# Patient Record
Sex: FEMALE | Race: BLACK OR AFRICAN AMERICAN | NOT HISPANIC OR LATINO
[De-identification: names, ages, dates, MRNs, and addresses within clinical notes are randomized per-mention and may not be internally consistent; named-entity substitution may affect disease eponyms.]

---

## 2017-01-09 ENCOUNTER — APPOINTMENT (OUTPATIENT)
Dept: OBGYN | Facility: CLINIC | Age: 22
End: 2017-01-09

## 2017-01-11 ENCOUNTER — APPOINTMENT (OUTPATIENT)
Dept: FAMILY MEDICINE | Facility: CLINIC | Age: 22
End: 2017-01-11

## 2017-02-07 ENCOUNTER — APPOINTMENT (OUTPATIENT)
Dept: OBGYN | Facility: CLINIC | Age: 22
End: 2017-02-07

## 2017-12-01 ENCOUNTER — OUTPATIENT (OUTPATIENT)
Dept: OUTPATIENT SERVICES | Facility: HOSPITAL | Age: 22
LOS: 1 days | End: 2017-12-01

## 2017-12-17 ENCOUNTER — EMERGENCY (EMERGENCY)
Facility: HOSPITAL | Age: 22
LOS: 1 days | Discharge: LEFT BEFORE DISCHARGE | End: 2017-12-17
Admitting: EMERGENCY MEDICINE
Payer: MEDICAID

## 2017-12-17 VITALS — WEIGHT: 125 LBS | HEIGHT: 65 IN

## 2017-12-17 VITALS
DIASTOLIC BLOOD PRESSURE: 53 MMHG | HEART RATE: 73 BPM | OXYGEN SATURATION: 100 % | SYSTOLIC BLOOD PRESSURE: 124 MMHG | TEMPERATURE: 98 F | RESPIRATION RATE: 18 BRPM

## 2017-12-17 LAB
APPEARANCE UR: CLEAR — SIGNIFICANT CHANGE UP
BILIRUB UR-MCNC: NEGATIVE — SIGNIFICANT CHANGE UP
COLOR SPEC: YELLOW — SIGNIFICANT CHANGE UP
DIFF PNL FLD: NEGATIVE — SIGNIFICANT CHANGE UP
EPI CELLS # UR: SIGNIFICANT CHANGE UP
GLUCOSE UR QL: NEGATIVE MG/DL — SIGNIFICANT CHANGE UP
HCG UR QL: POSITIVE
KETONES UR-MCNC: NEGATIVE — SIGNIFICANT CHANGE UP
LEUKOCYTE ESTERASE UR-ACNC: NEGATIVE — SIGNIFICANT CHANGE UP
NITRITE UR-MCNC: NEGATIVE — SIGNIFICANT CHANGE UP
PH UR: 7 — SIGNIFICANT CHANGE UP (ref 5–8)
PROT UR-MCNC: 15 MG/DL
SP GR SPEC: 1.01 — SIGNIFICANT CHANGE UP (ref 1.01–1.02)
UROBILINOGEN FLD QL: NEGATIVE MG/DL — SIGNIFICANT CHANGE UP
WBC UR QL: SIGNIFICANT CHANGE UP

## 2017-12-17 PROCEDURE — 81001 URINALYSIS AUTO W/SCOPE: CPT

## 2017-12-17 PROCEDURE — 81025 URINE PREGNANCY TEST: CPT

## 2017-12-17 NOTE — ED ADULT TRIAGE NOTE - CHIEF COMPLAINT QUOTE
had an  medical   and stopped bleeding 2017. states she still feels pregnant has lower back pain with migraines. no bleeding

## 2017-12-19 DIAGNOSIS — R69 ILLNESS, UNSPECIFIED: ICD-10-CM

## 2018-01-17 ENCOUNTER — EMERGENCY (EMERGENCY)
Facility: HOSPITAL | Age: 23
LOS: 1 days | Discharge: HOME | End: 2018-01-17
Attending: EMERGENCY MEDICINE | Admitting: EMERGENCY MEDICINE
Payer: MEDICAID

## 2018-01-17 VITALS
SYSTOLIC BLOOD PRESSURE: 102 MMHG | OXYGEN SATURATION: 100 % | DIASTOLIC BLOOD PRESSURE: 67 MMHG | TEMPERATURE: 99 F | WEIGHT: 125 LBS | HEART RATE: 96 BPM | RESPIRATION RATE: 18 BRPM | HEIGHT: 69 IN

## 2018-01-17 LAB
APPEARANCE UR: CLEAR — SIGNIFICANT CHANGE UP
BILIRUB UR-MCNC: NEGATIVE — SIGNIFICANT CHANGE UP
COLOR SPEC: YELLOW — SIGNIFICANT CHANGE UP
DIFF PNL FLD: ABNORMAL
GLUCOSE UR QL: NEGATIVE MG/DL — SIGNIFICANT CHANGE UP
HCG UR QL: POSITIVE
KETONES UR-MCNC: NEGATIVE — SIGNIFICANT CHANGE UP
LEUKOCYTE ESTERASE UR-ACNC: ABNORMAL
NITRITE UR-MCNC: POSITIVE
PH UR: 6.5 — SIGNIFICANT CHANGE UP (ref 5–8)
PROT UR-MCNC: 15 MG/DL
SP GR SPEC: 1.01 — SIGNIFICANT CHANGE UP (ref 1.01–1.02)
UROBILINOGEN FLD QL: NEGATIVE MG/DL — SIGNIFICANT CHANGE UP

## 2018-01-17 PROCEDURE — 99284 EMERGENCY DEPT VISIT MOD MDM: CPT | Mod: 25

## 2018-01-17 PROCEDURE — 76817 TRANSVAGINAL US OBSTETRIC: CPT | Mod: 26

## 2018-01-17 PROCEDURE — 76801 OB US < 14 WKS SINGLE FETUS: CPT | Mod: 26

## 2018-01-17 PROCEDURE — 99284 EMERGENCY DEPT VISIT MOD MDM: CPT

## 2018-01-17 PROCEDURE — 81001 URINALYSIS AUTO W/SCOPE: CPT

## 2018-01-17 PROCEDURE — 87186 SC STD MICRODIL/AGAR DIL: CPT

## 2018-01-17 PROCEDURE — 87086 URINE CULTURE/COLONY COUNT: CPT

## 2018-01-17 PROCEDURE — 76801 OB US < 14 WKS SINGLE FETUS: CPT

## 2018-01-17 PROCEDURE — 81025 URINE PREGNANCY TEST: CPT

## 2018-01-17 PROCEDURE — 76817 TRANSVAGINAL US OBSTETRIC: CPT

## 2018-01-17 RX ORDER — ACETAMINOPHEN 500 MG
650 TABLET ORAL ONCE
Qty: 0 | Refills: 0 | Status: COMPLETED | OUTPATIENT
Start: 2018-01-17 | End: 2018-01-17

## 2018-01-17 RX ORDER — NITROFURANTOIN MACROCRYSTAL 50 MG
1 CAPSULE ORAL
Qty: 20 | Refills: 0 | OUTPATIENT
Start: 2018-01-17 | End: 2018-01-26

## 2018-01-17 RX ORDER — NITROFURANTOIN MACROCRYSTAL 50 MG
100 CAPSULE ORAL ONCE
Qty: 0 | Refills: 0 | Status: COMPLETED | OUTPATIENT
Start: 2018-01-17 | End: 2018-01-17

## 2018-01-17 RX ADMIN — Medication 650 MILLIGRAM(S): at 18:23

## 2018-01-17 RX ADMIN — Medication 100 MILLIGRAM(S): at 18:23

## 2018-01-17 NOTE — ED ADULT NURSE NOTE - OBJECTIVE STATEMENT
pt alert and awake x3, arrived to ED with burning upon urination x2 days, denies foul smell, denies chest pain/sob, denies blleding.

## 2018-01-17 NOTE — ED STATDOCS - MUSCULOSKELETAL, MLM
range of motion is not limited and there is no muscle tenderness. range of motion is not limited and there is no muscle tenderness.  No CVA tenderness.

## 2018-01-17 NOTE — ED STATDOCS - OBJECTIVE STATEMENT
The patient is 23 y/o F with hx of scoliosis that presents to ED c/o pelvic pain and low back pain associated with urinary frequency  for 2 days. Patient notes she feels bladder pressure and urinary urgency. She rates pain at 8/10. She notes having cold symptoms recently with fever and sweats. No further complaints at this time.   LMP: December but it was light for 2 days The patient is 21 y/o F with hx of scoliosis that presents to ED c/o pelvic pain and low back pain associated with urinary frequency  for 2 days. Patient notes she feels bladder pressure and urinary urgency. She rates pain at 8/10. She notes having cold symptoms recently with fever and sweats. No further complaints at this time.   LMP: December but it was light for 2 days

## 2018-01-17 NOTE — ED STATDOCS - MEDICAL DECISION MAKING DETAILS
Pt with suprapubic pain, found to be pregnant n ED, antibiotics for UTI and check US to r/o ectopic pregnancy

## 2020-05-08 ENCOUNTER — EMERGENCY (EMERGENCY)
Facility: HOSPITAL | Age: 25
LOS: 1 days | Discharge: HOME | End: 2020-05-08
Attending: EMERGENCY MEDICINE
Payer: MEDICAID

## 2020-05-08 VITALS
SYSTOLIC BLOOD PRESSURE: 123 MMHG | OXYGEN SATURATION: 99 % | TEMPERATURE: 98 F | HEIGHT: 65 IN | WEIGHT: 160.06 LBS | HEART RATE: 71 BPM | DIASTOLIC BLOOD PRESSURE: 70 MMHG | RESPIRATION RATE: 18 BRPM

## 2020-05-08 PROCEDURE — 99283 EMERGENCY DEPT VISIT LOW MDM: CPT

## 2020-05-08 RX ORDER — KETOTIFEN FUMARATE 0.34 MG/ML
1 SOLUTION OPHTHALMIC ONCE
Refills: 0 | Status: DISCONTINUED | OUTPATIENT
Start: 2020-05-08 | End: 2020-05-08

## 2020-05-08 RX ORDER — KETOTIFEN FUMARATE 0.34 MG/ML
1 SOLUTION OPHTHALMIC ONCE
Refills: 0 | Status: COMPLETED | OUTPATIENT
Start: 2020-05-08 | End: 2020-05-08

## 2020-05-08 RX ADMIN — KETOTIFEN FUMARATE 1 DROP(S): 0.34 SOLUTION OPHTHALMIC at 11:47

## 2020-05-08 RX ADMIN — Medication 1 TABLET(S): at 11:47
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
## 2020-05-08 NOTE — ED STATDOCS - CARE PROVIDER_API CALL
Horace Clemens)  Ophthalmology  [address] [place] [address]  Phone: [phone]  Fax: [phone]  Follow Up Time: 1-3 Days

## 2020-05-08 NOTE — ED STATDOCS - PATIENT PORTAL LINK FT
You can access the FollowMyHealth Patient Portal offered by [facility] by registering at the following website: [email or web address]. By joining [email or web address]’s FollowMyHealth portal, you will also be able to view your health information using other applications (apps) compatible with our system.

## 2020-05-08 NOTE — ED ADULT NURSE NOTE - OBJECTIVE STATEMENT
Patient A&Ox4 complaining of pain to both eyes x2 days. Presents with redness & swelling to right eye. Stated smokes week, may have rubbed eye with residue on fingers.

## 2020-05-08 NOTE — ED STATDOCS - NSFOLLOWUPINSTRUCTIONS_ED_ALL_ED_FT
- Follow up with Dr Clemens, see information above.   - Take Tylenol (Acetaminophen) 650mg or Motrin (Ibuprofen/Advil) 600mg every 6 hours as needed for pain.  - Apply cool compresses as tolerated.   - Take Augmentin twice a day for 10 days.   - Apply Ketotifen drops 3 times a day (1 drop to each eye).   - Return to the ED for any new or worsening symptoms.     Conjunctivitis    Conjunctivitis is an inflammation of the clear membrane that covers the white part of your eye and the inner surface of your eyelid (conjunctiva). Symptoms include eye redness, eye pain, tearing and drainage, crusting of eyelids, swollen eyelids, and light sensitivity. Conjunctivitis may be contagious and easily spread from person to person. It can be caused by a virus, bacteria, or as part of an allergic reaction; the treatment depends on the type of conjunctivitis suspected. Avoid touching or rubbing your eyes and wipe away any drainage gently with a warm wet washcloth. Do not wear contact lenses until the inflammation is gone – wear glasses until your health care provider says it is safe to wear contact again. Do not share towels or washcloths that may spread the infection and wash your hands frequently.    SEEK IMMEDIATE MEDICAL CARE IF YOU HAVE ANY OF THE FOLLOWING SYMPTOMS: increasing pain, blurry vision, blindness, fever, or facial pain/redness/swelling.

## 2020-05-08 NOTE — ED STATDOCS - CLINICAL SUMMARY MEDICAL DECISION MAKING FREE TEXT BOX
24 yo F with allergic conjunctivitis also with eye lid swelling. Concerning for possible early cellulitis. Will give antihistamine eye drops, antibiotics and give optho referral 24 yo F with allergic conjunctivitis, also with eye lid swelling concerning for possible early cellulitis. Will give antihistamine eye drops, antibiotics and give optho referral

## 2020-05-08 NOTE — ED STATDOCS - PHYSICAL EXAMINATION
Gen: Well appearing in NAD  Eyes: right eye lid swelling no erythema or tenderness, right conjunctival injection and chemosis and clear watery discharge, left eye with mild conjunctival injection, eomi and perrla  Head: NC/AT  Neck: trachea midline  Resp:  No distress  Ext: no deformities  Neuro:  A&O appears non focal  Skin:  Warm and dry as visualized  Psych:  Normal affect and mood Gen: Well appearing in NAD  Eyes: right eye lid swelling, no erythema or tenderness, right conjunctival injection, cobblestoning and chemosis and clear watery discharge, left eye with mild conjunctival injection, EOMI, PERRLA  Head: NC/AT  Neck: trachea midline  Resp:  No distress  Ext: no deformities  Neuro:  A&O appears non focal  Skin:  Warm and dry as visualized  Psych:  Normal affect and mood

## 2020-05-08 NOTE — ED ADULT NURSE NOTE - ED STAT RN HANDOFF DETAILS
Patient verbalized understanding of discharge instructions using teach back method, need for followup with opthalmology within the next 3 days. Patient also provided with signs and symptoms to return to the emergency department immediately if they should present. Patient A&Ox3, respirations even/unlabored, patient in no apparent distress upon discharge. Medications

## 2020-05-08 NOTE — ED ADULT TRIAGE NOTE - CHIEF COMPLAINT QUOTE
Pt states "on Monday I went to urgent care and they gave me antibiotics for pink eye and my eye is getting worse and I feel it in the other one"

## 2020-09-23 ENCOUNTER — EMERGENCY (EMERGENCY)
Facility: HOSPITAL | Age: 25
LOS: 1 days | Discharge: HOME | End: 2020-09-23
Attending: EMERGENCY MEDICINE
Payer: MEDICAID

## 2020-09-23 VITALS
WEIGHT: 164.91 LBS | DIASTOLIC BLOOD PRESSURE: 77 MMHG | OXYGEN SATURATION: 97 % | HEIGHT: 65 IN | HEART RATE: 102 BPM | TEMPERATURE: 98 F | SYSTOLIC BLOOD PRESSURE: 117 MMHG | RESPIRATION RATE: 18 BRPM

## 2020-09-23 VITALS — HEART RATE: 76 BPM | TEMPERATURE: 99 F

## 2020-09-23 DIAGNOSIS — Z90.49 ACQUIRED ABSENCE OF OTHER SPECIFIED PARTS OF DIGESTIVE TRACT: Chronic | ICD-10-CM

## 2020-09-23 LAB
ALBUMIN SERPL ELPH-MCNC: 3.6 G/DL — SIGNIFICANT CHANGE UP (ref 3.3–5.2)
ALP SERPL-CCNC: 53 U/L — SIGNIFICANT CHANGE UP (ref 40–120)
ALT FLD-CCNC: 8 U/L — SIGNIFICANT CHANGE UP
ANION GAP SERPL CALC-SCNC: 13 MMOL/L — SIGNIFICANT CHANGE UP (ref 5–17)
APPEARANCE UR: CLEAR — SIGNIFICANT CHANGE UP
AST SERPL-CCNC: 12 U/L — SIGNIFICANT CHANGE UP
BACTERIA # UR AUTO: ABNORMAL
BASOPHILS # BLD AUTO: 0.02 K/UL — SIGNIFICANT CHANGE UP (ref 0–0.2)
BASOPHILS NFR BLD AUTO: 0.2 % — SIGNIFICANT CHANGE UP (ref 0–2)
BILIRUB SERPL-MCNC: <0.2 MG/DL — LOW (ref 0.4–2)
BILIRUB UR-MCNC: NEGATIVE — SIGNIFICANT CHANGE UP
BLD GP AB SCN SERPL QL: SIGNIFICANT CHANGE UP
BUN SERPL-MCNC: 8 MG/DL — SIGNIFICANT CHANGE UP (ref 8–20)
CALCIUM SERPL-MCNC: 9.4 MG/DL — SIGNIFICANT CHANGE UP (ref 8.6–10.2)
CHLORIDE SERPL-SCNC: 101 MMOL/L — SIGNIFICANT CHANGE UP (ref 98–107)
CO2 SERPL-SCNC: 22 MMOL/L — SIGNIFICANT CHANGE UP (ref 22–29)
COLOR SPEC: YELLOW — SIGNIFICANT CHANGE UP
CREAT SERPL-MCNC: 0.48 MG/DL — LOW (ref 0.5–1.3)
DIFF PNL FLD: ABNORMAL
EOSINOPHIL # BLD AUTO: 0.08 K/UL — SIGNIFICANT CHANGE UP (ref 0–0.5)
EOSINOPHIL NFR BLD AUTO: 0.8 % — SIGNIFICANT CHANGE UP (ref 0–6)
EPI CELLS # UR: SIGNIFICANT CHANGE UP
GLUCOSE SERPL-MCNC: 111 MG/DL — HIGH (ref 70–99)
GLUCOSE UR QL: NEGATIVE — SIGNIFICANT CHANGE UP
HCG SERPL-ACNC: HIGH MIU/ML
HCT VFR BLD CALC: 29.5 % — LOW (ref 34.5–45)
HGB BLD-MCNC: 9.6 G/DL — LOW (ref 11.5–15.5)
IMM GRANULOCYTES NFR BLD AUTO: 0.3 % — SIGNIFICANT CHANGE UP (ref 0–1.5)
KETONES UR-MCNC: ABNORMAL
LEUKOCYTE ESTERASE UR-ACNC: ABNORMAL
LYMPHOCYTES # BLD AUTO: 1.33 K/UL — SIGNIFICANT CHANGE UP (ref 1–3.3)
LYMPHOCYTES # BLD AUTO: 12.5 % — LOW (ref 13–44)
MCHC RBC-ENTMCNC: 30.3 PG — SIGNIFICANT CHANGE UP (ref 27–34)
MCHC RBC-ENTMCNC: 32.5 GM/DL — SIGNIFICANT CHANGE UP (ref 32–36)
MCV RBC AUTO: 93.1 FL — SIGNIFICANT CHANGE UP (ref 80–100)
MONOCYTES # BLD AUTO: 0.53 K/UL — SIGNIFICANT CHANGE UP (ref 0–0.9)
MONOCYTES NFR BLD AUTO: 5 % — SIGNIFICANT CHANGE UP (ref 2–14)
NEUTROPHILS # BLD AUTO: 8.65 K/UL — HIGH (ref 1.8–7.4)
NEUTROPHILS NFR BLD AUTO: 81.2 % — HIGH (ref 43–77)
NITRITE UR-MCNC: POSITIVE
PH UR: 8 — SIGNIFICANT CHANGE UP (ref 5–8)
PLATELET # BLD AUTO: 207 K/UL — SIGNIFICANT CHANGE UP (ref 150–400)
POTASSIUM SERPL-MCNC: 4.1 MMOL/L — SIGNIFICANT CHANGE UP (ref 3.5–5.3)
POTASSIUM SERPL-SCNC: 4.1 MMOL/L — SIGNIFICANT CHANGE UP (ref 3.5–5.3)
PROT SERPL-MCNC: 6.6 G/DL — SIGNIFICANT CHANGE UP (ref 6.6–8.7)
PROT UR-MCNC: 100
RBC # BLD: 3.17 M/UL — LOW (ref 3.8–5.2)
RBC # FLD: 13.4 % — SIGNIFICANT CHANGE UP (ref 10.3–14.5)
RBC CASTS # UR COMP ASSIST: ABNORMAL /HPF (ref 0–4)
SODIUM SERPL-SCNC: 136 MMOL/L — SIGNIFICANT CHANGE UP (ref 135–145)
SP GR SPEC: 1.01 — SIGNIFICANT CHANGE UP (ref 1.01–1.02)
UROBILINOGEN FLD QL: NEGATIVE — SIGNIFICANT CHANGE UP
WBC # BLD: 10.64 K/UL — HIGH (ref 3.8–10.5)
WBC # FLD AUTO: 10.64 K/UL — HIGH (ref 3.8–10.5)
WBC UR QL: SIGNIFICANT CHANGE UP

## 2020-09-23 PROCEDURE — 81001 URINALYSIS AUTO W/SCOPE: CPT

## 2020-09-23 PROCEDURE — 84702 CHORIONIC GONADOTROPIN TEST: CPT

## 2020-09-23 PROCEDURE — 85025 COMPLETE CBC W/AUTO DIFF WBC: CPT

## 2020-09-23 PROCEDURE — 99284 EMERGENCY DEPT VISIT MOD MDM: CPT | Mod: 25

## 2020-09-23 PROCEDURE — 80053 COMPREHEN METABOLIC PANEL: CPT

## 2020-09-23 PROCEDURE — 76805 OB US >/= 14 WKS SNGL FETUS: CPT

## 2020-09-23 PROCEDURE — 86850 RBC ANTIBODY SCREEN: CPT

## 2020-09-23 PROCEDURE — 76805 OB US >/= 14 WKS SNGL FETUS: CPT | Mod: 26

## 2020-09-23 PROCEDURE — 96372 THER/PROPH/DIAG INJ SC/IM: CPT

## 2020-09-23 PROCEDURE — 86901 BLOOD TYPING SEROLOGIC RH(D): CPT

## 2020-09-23 PROCEDURE — 99285 EMERGENCY DEPT VISIT HI MDM: CPT

## 2020-09-23 PROCEDURE — 36415 COLL VENOUS BLD VENIPUNCTURE: CPT

## 2020-09-23 PROCEDURE — 86900 BLOOD TYPING SEROLOGIC ABO: CPT

## 2020-09-23 RX ORDER — ACETAMINOPHEN 500 MG
975 TABLET ORAL ONCE
Refills: 0 | Status: COMPLETED | OUTPATIENT
Start: 2020-09-23 | End: 2020-09-23

## 2020-09-23 RX ORDER — CEFTRIAXONE 500 MG/1
1000 INJECTION, POWDER, FOR SOLUTION INTRAMUSCULAR; INTRAVENOUS ONCE
Refills: 0 | Status: COMPLETED | OUTPATIENT
Start: 2020-09-23 | End: 2020-09-23

## 2020-09-23 RX ORDER — CEFTRIAXONE 500 MG/1
1000 INJECTION, POWDER, FOR SOLUTION INTRAMUSCULAR; INTRAVENOUS ONCE
Refills: 0 | Status: DISCONTINUED | OUTPATIENT
Start: 2020-09-23 | End: 2020-09-23

## 2020-09-23 RX ORDER — CEFPODOXIME PROXETIL 100 MG
2 TABLET ORAL
Qty: 40 | Refills: 0
Start: 2020-09-23 | End: 2020-10-02

## 2020-09-23 RX ORDER — CEFPODOXIME PROXETIL 100 MG
200 TABLET ORAL ONCE
Refills: 0 | Status: DISCONTINUED | OUTPATIENT
Start: 2020-09-23 | End: 2020-09-23

## 2020-09-23 RX ADMIN — Medication 975 MILLIGRAM(S): at 17:04

## 2020-09-23 RX ADMIN — CEFTRIAXONE 1000 MILLIGRAM(S): 500 INJECTION, POWDER, FOR SOLUTION INTRAMUSCULAR; INTRAVENOUS at 17:04

## 2020-09-23 NOTE — ED PROVIDER NOTE - PATIENT PORTAL LINK FT
You can access the FollowMyHealth Patient Portal offered by [facility] by registering at the following website: [email or web address]. By joining [employer]’s FollowMyHealth portal, you will also be able to view your health information using other applications (apps) compatible with our system. You can access the FollowMyHealth Patient Portal offered by [facility] by registering at the following website: [email or web address]. By joining [employer]’s FollowMyHealth portal, you will also be able to view your health information using other applications (apps) compatible with our system.

## 2020-09-23 NOTE — ED PROVIDER NOTE - NSFOLLOWUPINSTRUCTIONS_ED_ALL_ED_FT
Follow up with PMD.   Take medication as prescribed.  Tylenol/motrin for pain.  Come back with new or worsening symptoms including increased pain/ nausea/vomiting/fever.    Pyelonephritis    Pyelonephritis is a kidney infection. In most cases, the infection clears up with treatment and does not cause further problems. More severe infections or chronic infections can sometimes spread to the bloodstream or lead to other problems with the kidneys. Symptoms include frequent or painful urination, abdominal pain, back pain, flank pain, fever/chills, nausea, or vomiting. If you were prescribed an antibiotic medicine, take it as told by your health care provider. Do not stop taking the antibiotic even if you start to feel better.    SEEK IMMEDIATE MEDICAL CARE IF YOU HAVE ANY OF THE FOLLOWING SYMPTOMS: inability to hold down antibiotics or fluids, worsening pain, dizziness/lightheadedness, or change in mental status. Follow up with urology/ OB/GYN.   Take medication as prescribed.  Tylenol for pain.  Pelvic rest.  Come back with new or worsening symptoms including increased pain/ nausea/vomiting/fever.    Pyelonephritis    Pyelonephritis is a kidney infection. In most cases, the infection clears up with treatment and does not cause further problems. More severe infections or chronic infections can sometimes spread to the bloodstream or lead to other problems with the kidneys. Symptoms include frequent or painful urination, abdominal pain, back pain, flank pain, fever/chills, nausea, or vomiting. If you were prescribed an antibiotic medicine, take it as told by your health care provider. Do not stop taking the antibiotic even if you start to feel better.    SEEK IMMEDIATE MEDICAL CARE IF YOU HAVE ANY OF THE FOLLOWING SYMPTOMS: inability to hold down antibiotics or fluids, worsening pain, dizziness/lightheadedness, or change in mental status.

## 2020-09-23 NOTE — ED PROVIDER NOTE - NSFOLLOWUPCLINICS_GEN_ALL_ED_FT
[address] [place] [address]  Phone: [phone]  Fax:   Follow Up Time:        [address] [place] [address]  Phone: [phone]  Fax:

## 2020-09-23 NOTE — ED PROVIDER NOTE - PROGRESS NOTE DETAILS
PT evaluated by intake physician. HPI/PE/ROS as noted above. Will follow up plan per intake physician. Pt with UTI. Will treat for pyelonephritis. Pt given strict return precautions. Will dc. PT evaluated by intake physician. HPI/PE/ROS as noted above. Will follow up plan per intake physician. Pt with UTI. Will treat for pyelonephritis. Pt found to be pregnant. US ordered and Pt told of results. Spoke with attending and with pt appearing well will try to dc with oral medication. Pt agreeable she would like to trial home and will come back if symptoms persist or worsen. US shows live intrauterine gestation with subchorionic hematoma. Pt told of results and need to f/u. Pt awaiting type and screen. Type and screen resulted and pt does not need rhogam shot. Pt feeling better. Pt again offered admission vs home trial and pt would like to go home with oral medication and strict return precautions. Will dc with vantin and have f/u with urology/OB. Pt agreeable. Will dc. Type and screen resulted and pt does not need rhogam shot. Pt feeling better and tolerating PO. Pt again offered admission vs home trial and pt would like to go home with oral medication and strict return precautions. Will dc with vantin and have f/u with urology/OB. Pt agreeable. Will dc.

## 2020-09-23 NOTE — ED ADULT TRIAGE NOTE - BSA (M2)
BRIEF HISTORY:    Patient presents for therapeutic injections in the bilateral Knee(s) for OA. No complaints expressed at this time. The patient was counseled about risks and benefits of knee Visco-supplementation and other injections in general, including but not limited to pain, infection even that requiring surgery to clean out, failure to provide relief, transient flare, tissue damage. Patient expressed understanding, was given the opportunity to ask questions and any questions answered and consented verbally. Time out performed for localization, medication and patient identification using multiple identifiers.    Procedure Note:    Following a standard, sterile 2-antiseptic prep and negative arthrocentesis, Euflexxa was instilled in the bilateral knee(s) with no immediate discomfort. The procedure was tolerated well with no immediate adverse effects.    Follow up for next in series as scheduled.       Given the acuity of patient's complaints, complexity and risk of acute or chronic disease states, and the necessity to perform aspects of an exam that cannot be accomplished 'virtually', I consider patient's visit medically necessary. All precautions including masks, distancing, room cleansing, etc were employed. Pain from her knees and has been delayed for her follow on shots.    This was injection #2 that was delayed from early April.  
1.82
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
## 2020-09-23 NOTE — ED PROVIDER NOTE - CARE PROVIDER_API CALL
Jay Snell  UROLOGY  [address], [address]  [place] [address]  Phone: [phone]  Fax: [phone]  Follow Up Time:

## 2020-10-13 ENCOUNTER — APPOINTMENT (OUTPATIENT)
Dept: UROLOGY | Facility: CLINIC | Age: 25
End: 2020-10-13

## 2021-08-02 ENCOUNTER — EMERGENCY (EMERGENCY)
Facility: HOSPITAL | Age: 26
LOS: 1 days | Discharge: HOME | End: 2021-08-02
Attending: EMERGENCY MEDICINE
Payer: SELF-PAY

## 2021-08-02 VITALS
RESPIRATION RATE: 18 BRPM | TEMPERATURE: 99 F | SYSTOLIC BLOOD PRESSURE: 104 MMHG | OXYGEN SATURATION: 100 % | DIASTOLIC BLOOD PRESSURE: 59 MMHG | HEART RATE: 59 BPM

## 2021-08-02 VITALS
DIASTOLIC BLOOD PRESSURE: 99 MMHG | WEIGHT: 175.05 LBS | HEART RATE: 60 BPM | SYSTOLIC BLOOD PRESSURE: 138 MMHG | TEMPERATURE: 98 F | RESPIRATION RATE: 18 BRPM | OXYGEN SATURATION: 98 % | HEIGHT: 65 IN

## 2021-08-02 DIAGNOSIS — Z90.49 ACQUIRED ABSENCE OF OTHER SPECIFIED PARTS OF DIGESTIVE TRACT: Chronic | ICD-10-CM

## 2021-08-02 PROCEDURE — 73030 X-RAY EXAM OF SHOULDER: CPT

## 2021-08-02 PROCEDURE — 99283 EMERGENCY DEPT VISIT LOW MDM: CPT | Mod: 25

## 2021-08-02 PROCEDURE — 99284 EMERGENCY DEPT VISIT MOD MDM: CPT

## 2021-08-02 PROCEDURE — 73030 X-RAY EXAM OF SHOULDER: CPT | Mod: 26,LT

## 2021-08-02 RX ORDER — IBUPROFEN 200 MG
600 TABLET ORAL ONCE
Refills: 0 | Status: COMPLETED | OUTPATIENT
Start: 2021-08-02 | End: 2021-08-02

## 2021-08-02 RX ADMIN — Medication 600 MILLIGRAM(S): at 15:27

## 2021-08-02 RX ADMIN — Medication 600 MILLIGRAM(S): at 16:00

## 2021-08-02 NOTE — ED PROVIDER NOTE - PHYSICAL EXAMINATION
Const: Awake, alert and oriented. In no acute distress. Well appearing.  HEENT: NC/AT. Moist mucous membranes.  Eyes: No scleral icterus. EOMI.  Neck:. Soft and supple. Full ROM without pain.  Cardiac: Regular rate and regular rhythm. +S1/S2. Peripheral pulses 2+ and symmetric. No LE edema.  Resp: Speaking in full sentences. No evidence of respiratory distress. No wheezes, rales or rhonchi.  Abd: Soft, non-tender, non-distended. Normal bowel sounds in all 4 quadrants. No guarding or rebound.  Back: Spine midline and non-tender. No CVAT.  Musculoskeletal: Left shoulder TTP over AC joint. FROM left shoulder.   Skin: No rashes, abrasions or lacerations.  Lymph: No cervical lymphadenopathy.  Neuro: Awake, alert & oriented x 3. Moves all extremities symmetrically.
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
## 2021-08-02 NOTE — ED PROVIDER NOTE - PATIENT PORTAL LINK FT
You can access the FollowMyHealth Patient Portal offered by [facility] by registering at the following website: [email or web address]. By joining [employer]’s FollowMyHealth portal, you will also be able to view your health information using other applications (apps) compatible with our system.
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
## 2021-10-11 ENCOUNTER — APPOINTMENT (OUTPATIENT)
Dept: FAMILY MEDICINE | Facility: CLINIC | Age: 26
End: 2021-10-11

## 2021-10-24 NOTE — ED ADULT NURSE NOTE - BREATH SOUNDS, MLM
Godwin Zarate (MD)  Cardiovascular Disease  [address], [address]  [place], [place] [id number]  Phone: [phone]  Fax: [phone]  Follow Up Time: 1 week    Felicia Ronquillo  Internal Medicine  N  KATERINE MCCORMACK DO,    Phone: ([phone]  Fax: [phone]  Follow Up Time:    Clear

## 2021-12-14 NOTE — ED STATDOCS - MDM ORDERS SUBMITTED SELECTION
Ms. Aguilar,    Your labs are stable for you.    Please contact the clinic if you have additional questions.  Thank you.    Sincerely,    Leah Rhoades MD  
Medications
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
## 2023-05-16 NOTE — ED ADULT NURSE NOTE - PRIMARY CARE PROVIDER
5/16/2023   Chief Complaint   Patient presents with    Shoulder Pain     right        History of Present Illness:                             Lanette Allison is a 61 [facility] NONE

## 2023-05-24 ENCOUNTER — NON-APPOINTMENT (OUTPATIENT)
Age: 28
End: 2023-05-24

## 2024-09-17 ENCOUNTER — NON-APPOINTMENT (OUTPATIENT)
Age: 29
End: 2024-09-17

## 2025-01-04 ENCOUNTER — EMERGENCY (EMERGENCY)
Facility: HOSPITAL | Age: 30
LOS: 1 days | Discharge: HOME | End: 2025-01-04
Attending: EMERGENCY MEDICINE
Payer: MEDICAID

## 2025-01-04 VITALS
WEIGHT: 139.99 LBS | TEMPERATURE: 99 F | SYSTOLIC BLOOD PRESSURE: 123 MMHG | OXYGEN SATURATION: 96 % | HEART RATE: 120 BPM | RESPIRATION RATE: 20 BRPM | DIASTOLIC BLOOD PRESSURE: 85 MMHG

## 2025-01-04 VITALS
TEMPERATURE: 98 F | DIASTOLIC BLOOD PRESSURE: 77 MMHG | OXYGEN SATURATION: 99 % | HEART RATE: 65 BPM | RESPIRATION RATE: 19 BRPM | SYSTOLIC BLOOD PRESSURE: 121 MMHG

## 2025-01-04 DIAGNOSIS — Z90.49 ACQUIRED ABSENCE OF OTHER SPECIFIED PARTS OF DIGESTIVE TRACT: Chronic | ICD-10-CM

## 2025-01-04 LAB
ALBUMIN SERPL ELPH-MCNC: 3.8 G/DL — SIGNIFICANT CHANGE UP (ref 3.3–5.2)
ALP SERPL-CCNC: 62 U/L — SIGNIFICANT CHANGE UP (ref 40–120)
ALT FLD-CCNC: 8 U/L — SIGNIFICANT CHANGE UP
ANION GAP SERPL CALC-SCNC: 12 MMOL/L — SIGNIFICANT CHANGE UP (ref 5–17)
APPEARANCE UR: CLEAR — SIGNIFICANT CHANGE UP
AST SERPL-CCNC: 15 U/L — SIGNIFICANT CHANGE UP
BACTERIA # UR AUTO: ABNORMAL /HPF
BASOPHILS # BLD AUTO: 0.02 K/UL — SIGNIFICANT CHANGE UP (ref 0–0.2)
BASOPHILS NFR BLD AUTO: 0.2 % — SIGNIFICANT CHANGE UP (ref 0–2)
BILIRUB SERPL-MCNC: <0.2 MG/DL — LOW (ref 0.4–2)
BILIRUB UR-MCNC: NEGATIVE — SIGNIFICANT CHANGE UP
BUN SERPL-MCNC: 8 MG/DL — SIGNIFICANT CHANGE UP (ref 8–20)
CALCIUM SERPL-MCNC: 8.8 MG/DL — SIGNIFICANT CHANGE UP (ref 8.4–10.5)
CAST: 0 /LPF — SIGNIFICANT CHANGE UP (ref 0–4)
CHLORIDE SERPL-SCNC: 99 MMOL/L — SIGNIFICANT CHANGE UP (ref 96–108)
CO2 SERPL-SCNC: 24 MMOL/L — SIGNIFICANT CHANGE UP (ref 22–29)
COLOR SPEC: YELLOW — SIGNIFICANT CHANGE UP
CREAT SERPL-MCNC: 0.7 MG/DL — SIGNIFICANT CHANGE UP (ref 0.5–1.3)
D DIMER BLD IA.RAPID-MCNC: 285 NG/ML DDU — HIGH
DIFF PNL FLD: NEGATIVE — SIGNIFICANT CHANGE UP
EGFR: 120 ML/MIN/1.73M2 — SIGNIFICANT CHANGE UP
EGFR: 120 ML/MIN/1.73M2 — SIGNIFICANT CHANGE UP
EOSINOPHIL # BLD AUTO: 0 K/UL — SIGNIFICANT CHANGE UP (ref 0–0.5)
EOSINOPHIL NFR BLD AUTO: 0 % — SIGNIFICANT CHANGE UP (ref 0–6)
GLUCOSE SERPL-MCNC: 87 MG/DL — SIGNIFICANT CHANGE UP (ref 70–99)
GLUCOSE UR QL: NEGATIVE MG/DL — SIGNIFICANT CHANGE UP
HCG SERPL-ACNC: <4 MIU/ML — SIGNIFICANT CHANGE UP
HCT VFR BLD CALC: 35.2 % — SIGNIFICANT CHANGE UP (ref 34.5–45)
HGB BLD-MCNC: 11.4 G/DL — LOW (ref 11.5–15.5)
IMM GRANULOCYTES NFR BLD AUTO: 0.1 % — SIGNIFICANT CHANGE UP (ref 0–0.9)
KETONES UR-MCNC: 15 MG/DL
LEUKOCYTE ESTERASE UR-ACNC: ABNORMAL
LIDOCAIN IGE QN: 11 U/L — LOW (ref 22–51)
LYMPHOCYTES # BLD AUTO: 1.15 K/UL — SIGNIFICANT CHANGE UP (ref 1–3.3)
LYMPHOCYTES # BLD AUTO: 12.6 % — LOW (ref 13–44)
MCHC RBC-ENTMCNC: 29.5 PG — SIGNIFICANT CHANGE UP (ref 27–34)
MCHC RBC-ENTMCNC: 32.4 G/DL — SIGNIFICANT CHANGE UP (ref 32–36)
MCV RBC AUTO: 91 FL — SIGNIFICANT CHANGE UP (ref 80–100)
MONOCYTES # BLD AUTO: 0.83 K/UL — SIGNIFICANT CHANGE UP (ref 0–0.9)
MONOCYTES NFR BLD AUTO: 9.1 % — SIGNIFICANT CHANGE UP (ref 2–14)
NEUTROPHILS # BLD AUTO: 7.09 K/UL — SIGNIFICANT CHANGE UP (ref 1.8–7.4)
NEUTROPHILS NFR BLD AUTO: 78 % — HIGH (ref 43–77)
NITRITE UR-MCNC: NEGATIVE — SIGNIFICANT CHANGE UP
PH UR: 7 — SIGNIFICANT CHANGE UP (ref 5–8)
PLATELET # BLD AUTO: 232 K/UL — SIGNIFICANT CHANGE UP (ref 150–400)
POTASSIUM SERPL-MCNC: 4.5 MMOL/L — SIGNIFICANT CHANGE UP (ref 3.5–5.3)
POTASSIUM SERPL-SCNC: 4.5 MMOL/L — SIGNIFICANT CHANGE UP (ref 3.5–5.3)
PROT SERPL-MCNC: 7 G/DL — SIGNIFICANT CHANGE UP (ref 6.6–8.7)
PROT UR-MCNC: NEGATIVE MG/DL — SIGNIFICANT CHANGE UP
RBC # BLD: 3.87 M/UL — SIGNIFICANT CHANGE UP (ref 3.8–5.2)
RBC # FLD: 13.6 % — SIGNIFICANT CHANGE UP (ref 10.3–14.5)
RBC CASTS # UR COMP ASSIST: 4 /HPF — SIGNIFICANT CHANGE UP (ref 0–4)
SODIUM SERPL-SCNC: 135 MMOL/L — SIGNIFICANT CHANGE UP (ref 135–145)
SP GR SPEC: 1.03 — SIGNIFICANT CHANGE UP (ref 1–1.03)
SQUAMOUS # UR AUTO: 6 /HPF — HIGH (ref 0–5)
UROBILINOGEN FLD QL: 1 MG/DL — SIGNIFICANT CHANGE UP (ref 0.2–1)
WBC # BLD: 9.1 K/UL — SIGNIFICANT CHANGE UP (ref 3.8–10.5)
WBC # FLD AUTO: 9.1 K/UL — SIGNIFICANT CHANGE UP (ref 3.8–10.5)
WBC UR QL: 12 /HPF — HIGH (ref 0–5)

## 2025-01-04 PROCEDURE — 96374 THER/PROPH/DIAG INJ IV PUSH: CPT | Mod: XU

## 2025-01-04 PROCEDURE — 74177 CT ABD & PELVIS W/CONTRAST: CPT | Mod: MC

## 2025-01-04 PROCEDURE — 96376 TX/PRO/DX INJ SAME DRUG ADON: CPT | Mod: XU

## 2025-01-04 PROCEDURE — 81001 URINALYSIS AUTO W/SCOPE: CPT

## 2025-01-04 PROCEDURE — 85730 THROMBOPLASTIN TIME PARTIAL: CPT

## 2025-01-04 PROCEDURE — 36415 COLL VENOUS BLD VENIPUNCTURE: CPT

## 2025-01-04 PROCEDURE — 71275 CT ANGIOGRAPHY CHEST: CPT | Mod: 26,MC

## 2025-01-04 PROCEDURE — 87086 URINE CULTURE/COLONY COUNT: CPT

## 2025-01-04 PROCEDURE — 96375 TX/PRO/DX INJ NEW DRUG ADDON: CPT | Mod: XU

## 2025-01-04 PROCEDURE — 99284 EMERGENCY DEPT VISIT MOD MDM: CPT | Mod: 25

## 2025-01-04 PROCEDURE — 74177 CT ABD & PELVIS W/CONTRAST: CPT | Mod: 26,MC

## 2025-01-04 PROCEDURE — 71275 CT ANGIOGRAPHY CHEST: CPT | Mod: MC

## 2025-01-04 PROCEDURE — 85025 COMPLETE CBC W/AUTO DIFF WBC: CPT

## 2025-01-04 PROCEDURE — 85610 PROTHROMBIN TIME: CPT

## 2025-01-04 PROCEDURE — 85379 FIBRIN DEGRADATION QUANT: CPT

## 2025-01-04 PROCEDURE — 84702 CHORIONIC GONADOTROPIN TEST: CPT

## 2025-01-04 PROCEDURE — 71046 X-RAY EXAM CHEST 2 VIEWS: CPT

## 2025-01-04 PROCEDURE — 80053 COMPREHEN METABOLIC PANEL: CPT

## 2025-01-04 PROCEDURE — 99285 EMERGENCY DEPT VISIT HI MDM: CPT

## 2025-01-04 PROCEDURE — 71046 X-RAY EXAM CHEST 2 VIEWS: CPT | Mod: 26

## 2025-01-04 PROCEDURE — 83690 ASSAY OF LIPASE: CPT

## 2025-01-04 RX ORDER — METHOCARBAMOL 500 MG/1
1500 TABLET, FILM COATED ORAL ONCE
Refills: 0 | Status: COMPLETED | OUTPATIENT
Start: 2025-01-04 | End: 2025-01-04

## 2025-01-04 RX ORDER — KETOROLAC TROMETHAMINE 30 MG/ML
15 INJECTION, SOLUTION INTRAMUSCULAR; INTRAVENOUS ONCE
Refills: 0 | Status: DISCONTINUED | OUTPATIENT
Start: 2025-01-04 | End: 2025-01-04

## 2025-01-04 RX ORDER — ACETAMINOPHEN 500 MG/5ML
1000 LIQUID (ML) ORAL ONCE
Refills: 0 | Status: COMPLETED | OUTPATIENT
Start: 2025-01-04 | End: 2025-01-04

## 2025-01-04 RX ORDER — HYDROMORPHONE/SOD CHLOR,ISO/PF 2 MG/10 ML
0.5 SYRINGE (ML) INJECTION ONCE
Refills: 0 | Status: DISCONTINUED | OUTPATIENT
Start: 2025-01-04 | End: 2025-01-04

## 2025-01-04 RX ADMIN — Medication 1000 MILLILITER(S): at 19:00

## 2025-01-04 RX ADMIN — KETOROLAC TROMETHAMINE 15 MILLIGRAM(S): 30 INJECTION, SOLUTION INTRAMUSCULAR; INTRAVENOUS at 17:52

## 2025-01-04 RX ADMIN — Medication 400 MILLIGRAM(S): at 16:01

## 2025-01-04 RX ADMIN — Medication 4 MILLIGRAM(S): at 16:01

## 2025-01-04 RX ADMIN — METHOCARBAMOL 1500 MILLIGRAM(S): 500 TABLET, FILM COATED ORAL at 16:02

## 2025-01-04 RX ADMIN — KETOROLAC TROMETHAMINE 15 MILLIGRAM(S): 30 INJECTION, SOLUTION INTRAMUSCULAR; INTRAVENOUS at 23:05

## 2025-01-05 LAB
CULTURE RESULTS: SIGNIFICANT CHANGE UP
SPECIMEN SOURCE: SIGNIFICANT CHANGE UP

## 2025-01-05 RX ORDER — METHOCARBAMOL 500 MG/1
2 TABLET, FILM COATED ORAL
Qty: 30 | Refills: 0
Start: 2025-01-05 | End: 2025-01-09

## 2025-01-05 RX ORDER — IBUPROFEN 200 MG
1 TABLET ORAL
Qty: 28 | Refills: 0
Start: 2025-01-05 | End: 2025-01-11

## 2025-01-06 ENCOUNTER — TRANSCRIPTION ENCOUNTER (OUTPATIENT)
Age: 30
End: 2025-01-06

## 2025-02-07 ENCOUNTER — EMERGENCY (EMERGENCY)
Facility: HOSPITAL | Age: 30
LOS: 1 days | Discharge: HOME | End: 2025-02-07
Attending: EMERGENCY MEDICINE
Payer: MEDICAID

## 2025-02-07 VITALS
TEMPERATURE: 99 F | RESPIRATION RATE: 18 BRPM | WEIGHT: 143.3 LBS | SYSTOLIC BLOOD PRESSURE: 134 MMHG | DIASTOLIC BLOOD PRESSURE: 84 MMHG | OXYGEN SATURATION: 113 % | HEART RATE: 18 BPM

## 2025-02-07 VITALS
HEART RATE: 61 BPM | RESPIRATION RATE: 19 BRPM | DIASTOLIC BLOOD PRESSURE: 66 MMHG | SYSTOLIC BLOOD PRESSURE: 117 MMHG | OXYGEN SATURATION: 100 %

## 2025-02-07 DIAGNOSIS — Z90.49 ACQUIRED ABSENCE OF OTHER SPECIFIED PARTS OF DIGESTIVE TRACT: Chronic | ICD-10-CM

## 2025-02-07 PROCEDURE — 90471 IMMUNIZATION ADMIN: CPT

## 2025-02-07 PROCEDURE — 99284 EMERGENCY DEPT VISIT MOD MDM: CPT | Mod: 25

## 2025-02-07 PROCEDURE — 90715 TDAP VACCINE 7 YRS/> IM: CPT

## 2025-02-07 PROCEDURE — 12011 RPR F/E/E/N/L/M 2.5 CM/<: CPT

## 2025-02-07 RX ORDER — OXYCODONE HYDROCHLORIDE 30 MG/1
5 TABLET ORAL ONCE
Refills: 0 | Status: DISCONTINUED | OUTPATIENT
Start: 2025-02-07 | End: 2025-02-07

## 2025-02-07 RX ORDER — CLOSTRIDIUM TETANI TOXOID ANTIGEN (FORMALDEHYDE INACTIVATED), CORYNEBACTERIUM DIPHTHERIAE TOXOID ANTIGEN (FORMALDEHYDE INACTIVATED), BORDETELLA PERTUSSIS TOXOID ANTIGEN (GLUTARALDEHYDE INACTIVATED), BORDETELLA PERTUSSIS FILAMENTOUS HEMAGGLUTININ ANTIGEN (FORMALDEHYDE INACTIVATED), BORDETELLA PERTUSSIS PERTACTIN ANTIGEN, AND BORDETELLA PERTUSSIS FIMBRIAE 2/3 ANTIGEN 5; 2; 2.5; 5; 3; 5 [LF]/.5ML; [LF]/.5ML; UG/.5ML; UG/.5ML; UG/.5ML; UG/.5ML
0.5 INJECTION, SUSPENSION INTRAMUSCULAR ONCE
Refills: 0 | Status: COMPLETED | OUTPATIENT
Start: 2025-02-07 | End: 2025-02-07

## 2025-02-07 RX ORDER — ACETAMINOPHEN 160 MG/5ML
975 SUSPENSION ORAL ONCE
Refills: 0 | Status: COMPLETED | OUTPATIENT
Start: 2025-02-07 | End: 2025-02-07

## 2025-02-07 RX ADMIN — CLOSTRIDIUM TETANI TOXOID ANTIGEN (FORMALDEHYDE INACTIVATED), CORYNEBACTERIUM DIPHTHERIAE TOXOID ANTIGEN (FORMALDEHYDE INACTIVATED), BORDETELLA PERTUSSIS TOXOID ANTIGEN (GLUTARALDEHYDE INACTIVATED), BORDETELLA PERTUSSIS FILAMENTOUS HEMAGGLUTININ ANTIGEN (FORMALDEHYDE INACTIVATED), BORDETELLA PERTUSSIS PERTACTIN ANTIGEN, AND BORDETELLA PERTUSSIS FIMBRIAE 2/3 ANTIGEN 0.5 MILLILITER(S): 5; 2; 2.5; 5; 3; 5 INJECTION, SUSPENSION INTRAMUSCULAR at 13:52

## 2025-02-07 RX ADMIN — Medication 10 MILLIGRAM(S): at 15:39

## 2025-02-07 RX ADMIN — ACETAMINOPHEN 975 MILLIGRAM(S): 160 SUSPENSION ORAL at 15:39

## 2025-02-07 RX ADMIN — OXYCODONE HYDROCHLORIDE 5 MILLIGRAM(S): 30 TABLET ORAL at 15:39

## 2025-02-07 RX ADMIN — OXYCODONE HYDROCHLORIDE 5 MILLIGRAM(S): 30 TABLET ORAL at 13:52

## 2025-02-07 NOTE — ED ADULT NURSE NOTE - NS ED NURSE LEVEL OF CONSCIOUSNESS SPEECH
Speaking Coherently P: DVT HSQ 5000U q8h  C: FULL CODE  D: Admit to [facility] for management of acute CHF exacerbation under Service.

## 2025-02-07 NOTE — ED PROVIDER NOTE - PATIENT PORTAL LINK FT
You can access the FollowMyHealth Patient Portal offered by [facility] by registering at the following website: [email or web address]. By joining [employer]’s FollowMyHealth portal, you will also be able to view your health information using other applications (apps) compatible with our system.

## 2025-02-07 NOTE — ED PROVIDER NOTE - CARE PROVIDER_API CALL
Luis Carranza  Plastic Surgery  [address] [place] [id number]  Phone: [phone]  Fax: [phone]  Follow Up Time:
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
## 2025-02-07 NOTE — ED ADULT NURSE REASSESSMENT NOTE - NS ED NURSE REASSESS COMMENT FT1
Pt A+Ox4, respirations equal and unlabored on room air. Pt states that she does not want to wait for CT scan as she has to pick daughter up from school, PA made aware at this time.
Pt A+Ox4, respirations equal and unlabored on room air. Pt continues to c/o pain at this time, VS recorded, pt medicated per MD order. Pt awaiting CT scan. Pt left in position of comfort, wheels of stretcher locked and in the lowest position. Call bell within reach.

## 2025-02-07 NOTE — ED PROCEDURE NOTE - CPROC ED TIME OUT STATEMENT1
“Patient's name, , procedure and correct site were confirmed during the [place] Timeout.”
“Patient's name, , procedure and correct site were confirmed during the [place] Timeout.”
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
## 2025-02-07 NOTE — ED PROVIDER NOTE - PHYSICAL EXAMINATION
HEENT: tender to right maxilla, no raccoon eyes, no guzmán sings, no hemotympanum, PERRL, EOMI, no nystagmus, no dental injuries, approx 1 cm lac of the right cheek, extending into the inner mouth approx 1 cm in length   Neck: supple, no midline tenderness to palpation, nt to cervical spine paraspinal m,+ FROM, NEXUS negative, no abrasions, no ecchymosis  Chest: non tender, equal expansion bilaterally, no ecchymosis, no abrasions,   Lungs: CTA, good air entry bilaterally, no wheezing, no rales, no rhonchi  Abdomen: soft, non tender, no guarding, no rebound, no distention, no ecchymosis  Back: no midline tenderness to palpation   Extremities: atraumatic, + FROM, 5/5 strength  Skin: no rash,   Neuro: A & O x 3, clear speech, steady gait, cerrebellar intact, no focal deficits, CN II-XII intact, neg pronator sign HEENT: tender to right maxilla, no raccoon eyes, no guzmán sings, no hemotympanum, PERRL, EOMI, no nystagmus, no dental injuries, approx 1 cm lac of the right cheek, extending into the inner mouth approx 1 cm in length   Neck: supple, no midline tenderness to palpation, nt to cervical spine paraspinal m,+ FROM, NEXUS negative, no abrasions, no ecchymosis  Chest: non tender, equal expansion bilaterally, no ecchymosis, no abrasions,   Lungs: CTA, good air entry bilaterally, no wheezing, no rales, no rhonchi  Abdomen: soft, non tender, no guarding, no rebound, no distention, no ecchymosis  Back: no midline tenderness to palpation   Extremities: atraumatic, + FROM, 5/5 strength  Skin: no rash,   Neuro: A & O x 3, clear speech, steady gait, cerebellar intact, no focal deficits, CN II-XII intact, neg pronator sign
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
## 2025-02-07 NOTE — ED ADULT NURSE NOTE - NSFALLUNIVINTERV_ED_ALL_ED
Bed/Stretcher in lowest position, wheels locked, appropriate side rails in place/Call bell, personal items and telephone in reach/Instruct patient to call for assistance before getting out of bed/chair/stretcher/Non-slip footwear applied when patient is off stretcher/[place] to call system/Physically safe environment - no spills, clutter or unnecessary equipment/Purposeful proactive rounding/Room/bathroom lighting operational, light cord in reach

## 2025-02-28 ENCOUNTER — NON-APPOINTMENT (OUTPATIENT)
Age: 30
End: 2025-02-28